# Patient Record
Sex: MALE | URBAN - METROPOLITAN AREA
[De-identification: names, ages, dates, MRNs, and addresses within clinical notes are randomized per-mention and may not be internally consistent; named-entity substitution may affect disease eponyms.]

---

## 2023-04-15 ENCOUNTER — NURSE TRIAGE (OUTPATIENT)
Dept: ADMINISTRATIVE | Facility: CLINIC | Age: 63
End: 2023-04-15

## 2023-04-15 NOTE — TELEPHONE ENCOUNTER
Pt calling from [place].   Has Rt groin hernia and scheduled for sx in 2 weeks.   10/10 pain. Starts out as a burning pain then progresses to stopping pt in his tracks.   Care advice provided per protocol, with recommendation to proceed to ED at this time. Pt VU.     Unable to route d/t pt location    Reason for Disposition   Hernia is painful or tender to touch    Additional Information   Negative: SEVERE abdominal pain    Protocols used: Hernia-A-